# Patient Record
Sex: MALE | Race: AMERICAN INDIAN OR ALASKA NATIVE
[De-identification: names, ages, dates, MRNs, and addresses within clinical notes are randomized per-mention and may not be internally consistent; named-entity substitution may affect disease eponyms.]

---

## 2022-05-07 ENCOUNTER — HOSPITAL ENCOUNTER (EMERGENCY)
Dept: HOSPITAL 5 - ED | Age: 37
Discharge: HOME | End: 2022-05-07
Payer: SELF-PAY

## 2022-05-07 VITALS — DIASTOLIC BLOOD PRESSURE: 78 MMHG | SYSTOLIC BLOOD PRESSURE: 118 MMHG

## 2022-05-07 DIAGNOSIS — R07.9: Primary | ICD-10-CM

## 2022-05-07 LAB
ALBUMIN SERPL-MCNC: 4.4 G/DL (ref 3.9–5)
ALT SERPL-CCNC: 33 UNITS/L (ref 7–56)
ANISOCYTOSIS BLD QL SMEAR: (no result)
BAND NEUTROPHILS # (MANUAL): 0 K/MM3
BUN SERPL-MCNC: 13 MG/DL (ref 9–20)
BUN/CREAT SERPL: 12 %
CALCIUM SERPL-MCNC: 9 MG/DL (ref 8.4–10.2)
HCT VFR BLD CALC: 44.7 % (ref 35.5–45.6)
HEMOLYSIS INDEX: 88
HGB BLD-MCNC: 14.5 GM/DL (ref 11.8–15.2)
MCHC RBC AUTO-ENTMCNC: 32 % (ref 32–34)
MCV RBC AUTO: 95 FL (ref 84–94)
MYELOCYTES # (MANUAL): 0 K/MM3
PLATELET # BLD: 340 K/MM3 (ref 140–440)
PROMYELOCYTES # (MANUAL): 0 K/MM3
RBC # BLD AUTO: 4.7 M/MM3 (ref 3.65–5.03)
TOTAL CELLS COUNTED BLD: 100

## 2022-05-07 PROCEDURE — 85007 BL SMEAR W/DIFF WBC COUNT: CPT

## 2022-05-07 PROCEDURE — 36415 COLL VENOUS BLD VENIPUNCTURE: CPT

## 2022-05-07 PROCEDURE — 85025 COMPLETE CBC W/AUTO DIFF WBC: CPT

## 2022-05-07 PROCEDURE — 84484 ASSAY OF TROPONIN QUANT: CPT

## 2022-05-07 PROCEDURE — 96374 THER/PROPH/DIAG INJ IV PUSH: CPT

## 2022-05-07 PROCEDURE — 71045 X-RAY EXAM CHEST 1 VIEW: CPT

## 2022-05-07 PROCEDURE — 80053 COMPREHEN METABOLIC PANEL: CPT

## 2022-05-07 PROCEDURE — 99284 EMERGENCY DEPT VISIT MOD MDM: CPT

## 2022-05-07 PROCEDURE — 93005 ELECTROCARDIOGRAM TRACING: CPT

## 2022-05-07 NOTE — ELECTROCARDIOGRAPH REPORT
[facility]

                                       

Test Date:    2022               Test Time:    04:15:59

Pat Name:     MARIBELL MONTAGUE           Department:   

Patient ID:   [id number]          Room:          

Gender:       M                        Technician:   [email or web address]

:          1985               Requested By: VINCE RAMIREZ

Order Number: [id number]              Reading MD:   Ranjan Roque

                                 Measurements

Intervals                              Axis          

Rate:         94                       P:            36

AR:           148                      QRS:          15

QRSD:         84                       T:            25

QT:           336                                    

QTc:          420                                    

                           Interpretive Statements

Sinus rhythm

No previous ECG available for comparison

Electronically Signed On 2022 9:47:37 EDT by Ranjan Roque

## 2022-05-07 NOTE — XRAY REPORT
CHEST 1 VIEW 5/7/2022 3:56 AM



INDICATION / CLINICAL INFORMATION: Chest Pain.



COMPARISON: None available.



FINDINGS:



SUPPORT DEVICES: None.

HEART / MEDIASTINUM: No significant abnormality. 

LUNGS / PLEURA: No significant pulmonary or pleural abnormality. No pneumothorax. 



ADDITIONAL FINDINGS: No significant additional findings.



IMPRESSION:

1. No acute findings.



Signer Name: Benny Jaimes DO 

Signed: 5/7/2022 4:09 AM

Workstation Name: [id number]

## 2022-05-07 NOTE — EMERGENCY DEPARTMENT REPORT
ED Chest Pain HPI





- General


Chief Complaint: Chest Pain


Stated Complaint: CHEST PAIN


PUI?: No


Time Seen by Provider: 05/07/22 03:58


Source: patient


Mode of arrival: Stretcher


Limitations: No Limitations





- History of Present Illness


Initial Comments: 





Patient is a 36-year-old male presented ED with complaint of sharp right-sided c

hest pain that began at approximately 1 AM this morning.  The pain was 

nonradiating.  He also reports sensation of his tongue swelling and shortness of

breath.  Denies any history of CAD, MI, CHF, COPD or asthma.


MD Complaint: chest pain





- Related Data


                                    Allergies











Allergy/AdvReac Type Severity Reaction Status Date / Time


 


No Known Allergies Allergy   Unverified 05/07/22 03:51














Heart Score





- HEART Score


History: Slightly suspicious


EKG: Normal


Age: < 45


Risk factors: No known risk factors


Troponin: < normal limit


HEART Score: 0





- EKG Read Time


Time EKG Completed: 04:15


EKG Read Time: 04:20





- Critical Actions


Critical Actions: 0-3 pts:0.9-1.7%risk of adverse cardiac event.Candidate for 

discharge





ED Review of Systems


ROS: 


Stated complaint: CHEST PAIN


Other details as noted in HPI





Comment: All other systems reviewed and negative


Constitutional: denies: chills, fever


Respiratory: shortness of breath


Cardiovascular: chest pain


Gastrointestinal: denies: abdominal pain, nausea, diarrhea


Genitourinary: denies: urgency, dysuria


Skin: denies: rash, lesions


Neurological: denies: headache, weakness, paresthesias


Psychiatric: denies: anxiety, depression


Hematological/Lymphatic: denies: easy bleeding, easy bruising





ED Past Medical Hx





- Past Medical History


Previous Medical History?: Yes


Hx Asthma: Yes





- Surgical History


Past Surgical History?: Yes


Additional Surgical History: Spleenectomy





- Social History


Smoking Status: Never Smoker


Substance Use Type: None





ED Physical Exam





- General


Limitations: No Limitations


General appearance: alert, in no apparent distress





- Head


Head exam: Present: atraumatic, normocephalic





- Neck


Neck exam: Present: normal inspection





- Respiratory


Respiratory exam: Present: normal lung sounds bilaterally.  Absent: respiratory 

distress





- Cardiovascular


Cardiovascular Exam: Present: regular rate, normal rhythm.  Absent: systolic 

murmur, diastolic murmur, rubs, gallop





- GI/Abdominal


GI/Abdominal exam: Present: soft.  Absent: distended, tenderness





- Rectal


Rectal exam: Present: deferred





- Extremities Exam


Extremities exam: Present: normal inspection.  Absent: pedal edema





- Neurological Exam


Neurological exam: Present: alert, oriented X3





- Psychiatric


Psychiatric exam: Present: normal affect, normal mood





- Skin


Skin exam: Present: warm, dry, intact, normal color





ED Course





                                   Vital Signs











  05/07/22





  03:41


 


Temperature 98 F


 


Pulse Rate 94 H


 


Respiratory 18





Rate 


 


Blood Pressure 131/78


 


O2 Sat by Pulse 98





Oximetry 














ED Medical Decision Making





- Lab Data


Result diagrams: 


                                 05/07/22 04:08





                                 05/07/22 04:08





- EKG Data


-: EKG Interpreted by Me


EKG shows normal: sinus rhythm, axis, intervals, QRS complexes, ST-T waves


Rate: normal





- Radiology Data


Radiology results: report reviewed





No acute cardiopulmonary findings





- Medical Decision Making





36-year-old male presenting via EMS with complaint of sharp right-sided chest 

pain that began at 1 AM.  EKG is normal.  Chest x-ray is unremarkable.  CBC, CMP

and troponin are unremarkable.  Heart score 0.  Patient given IV Toradol for 

pain.  Very low suspicion for ACS or PE.  Vital signs are within normal range.  

Satting 100% on room air with normal work of breathing.  He is stable for 

discharge home with return precautions.


Critical care attestation.: 


If time is entered above; I have spent that time in minutes in the direct care 

of this critically ill patient, excluding procedure time.








ED Disposition


Clinical Impression: 


 Nonspecific chest pain





Disposition: 01 HOME / SELF CARE / HOMELESS


Is pt being admited?: No


Does the pt Need Aspirin: No


Condition: Stable


Instructions:  Nonspecific Chest Pain, Adult


Time of Disposition: 05:37